# Patient Record
Sex: FEMALE | Race: WHITE | NOT HISPANIC OR LATINO | Employment: OTHER | URBAN - METROPOLITAN AREA
[De-identification: names, ages, dates, MRNs, and addresses within clinical notes are randomized per-mention and may not be internally consistent; named-entity substitution may affect disease eponyms.]

---

## 2018-01-18 NOTE — PATIENT DISCUSSION
(L73.301) Keratoconjunct sicca, not specified as Sjogren's, bilateral - Assesment : Examination revealed Dry Eye Syndrome - Plan : Monitor for changes. Advised patient to call our office with decreased vision or increased symptoms.  CPM: RESTASIS OU BID REFRESH ATS QD OU

## 2018-01-18 NOTE — PATIENT DISCUSSION
(C99.4116) Primary open-angle glaucoma bilateral mild stage - Assesment : Examination revealed Primary Open Angle Glaucoma. -MILD  PATIENT IS FOLLOWED UP [facility] IN MAY FOR GLAUCOMA. IOP OU CONTROLLED NICELY ON CURRENT GTT TX OU.  IF IOP CONTINUES TO TREND UPWARDS, MAY CONSIDER SLT OU - Plan : Monitor for changes. Advised patient to call our office when decreased vision or increased eye pain. TIMOLOL 0.5 % OD QAM  LATANOPROST OU QHS.   3-4 MONTHS 24-2 OU, TENSION CHECK  1 YEAR EXAM - PT SIGNING RELEASE FOR RECORDS UP [facility]

## 2018-04-19 NOTE — PATIENT DISCUSSION
(X31.2595) Primary open-angle glaucoma bilateral moderate stage - Assesment : Examination revealed Primary Open Angle Glaucoma. -MODERATE OD>OS. INCREASED C/D  IOP CONTROLLED NICELY ON CURRENT GTT TX OU. - Plan : Monitor for changes. Advised patient to call our office when decreased vision or increased eye pain. TIMOLOL 0.5 % OD QAM  LATANOPROST OU QHS. FOLLOW OPHTHALMOLOGIST EVERY 6 MONTHS.  1 YEAR EXAM / ON OCT Visual field performed.

## 2018-08-08 ENCOUNTER — ESTABLISHED COMPREHENSIVE EXAM (OUTPATIENT)
Dept: URBAN - METROPOLITAN AREA CLINIC 46 | Facility: CLINIC | Age: 78
End: 2018-08-08

## 2018-08-08 DIAGNOSIS — H52.7: ICD-10-CM

## 2018-08-08 PROCEDURE — 92014 COMPRE OPH EXAM EST PT 1/>: CPT

## 2018-08-08 PROCEDURE — 92015 DETERMINE REFRACTIVE STATE: CPT

## 2018-08-08 ASSESSMENT — TONOMETRY
OD_IOP_MMHG: 14
OS_IOP_MMHG: 14

## 2018-08-08 ASSESSMENT — VISUAL ACUITY
OD_SC: 20/20
OS_SC: 20/20
OD_SC: J6-1
OS_SC: J1+

## 2019-04-18 NOTE — PATIENT DISCUSSION
(T61.1311) Primary open-angle glaucoma right eye severe stage - Assesment : Examination revealed primary open angle glaucoma.   SEVERE - Plan : CONTINUE LATANOPROST

## 2019-04-18 NOTE — PATIENT DISCUSSION
(I92.3356) Primary open-angle glaucoma left eye moderate stage - Assesment : Examination revealed Primary Open Angle Glaucoma. -MODERATE OD>OS. INCREASED C/D  IOP CONTROLLED NICELY ON CURRENT GTT TX OU. - Plan : Monitor for changes. Advised patient to call our office when decreased vision or increased eye pain. TIMOLOL 0.5 % OD QAM  LATANOPROST OU QHS. FOLLOW OPHTHALMOLOGIST EVERY 6 MONTHS.

## 2019-08-22 ENCOUNTER — ESTABLISHED COMPREHENSIVE EXAM (OUTPATIENT)
Dept: URBAN - METROPOLITAN AREA CLINIC 46 | Facility: CLINIC | Age: 79
End: 2019-08-22

## 2019-08-22 DIAGNOSIS — H40.013: ICD-10-CM

## 2019-08-22 PROCEDURE — 92014 COMPRE OPH EXAM EST PT 1/>: CPT

## 2019-08-22 ASSESSMENT — VISUAL ACUITY
OD_SC: J5
OS_SC: 20/20-2
OS_SC: J1
OD_SC: 20/20-2

## 2019-08-22 ASSESSMENT — TONOMETRY
OD_IOP_MMHG: 12
OS_IOP_MMHG: 12

## 2020-02-11 ENCOUNTER — EST. PATIENT EMERGENCY (OUTPATIENT)
Dept: URBAN - METROPOLITAN AREA CLINIC 36 | Facility: CLINIC | Age: 80
End: 2020-02-11

## 2020-02-11 DIAGNOSIS — H53.8: ICD-10-CM

## 2020-02-11 DIAGNOSIS — H35.371: ICD-10-CM

## 2020-02-11 DIAGNOSIS — H43.393: ICD-10-CM

## 2020-02-11 PROCEDURE — 92134 CPTRZ OPH DX IMG PST SGM RTA: CPT

## 2020-02-11 PROCEDURE — 92014 COMPRE OPH EXAM EST PT 1/>: CPT

## 2020-02-11 ASSESSMENT — TONOMETRY
OS_IOP_MMHG: 14
OD_IOP_MMHG: 14

## 2020-02-11 ASSESSMENT — VISUAL ACUITY
OD_SC: 20/50
OS_SC: 20/25-2

## 2020-02-17 ENCOUNTER — DILATED FUNDUS EXAM (OUTPATIENT)
Dept: URBAN - METROPOLITAN AREA CLINIC 46 | Facility: CLINIC | Age: 80
End: 2020-02-17

## 2020-02-17 DIAGNOSIS — H53.8: ICD-10-CM

## 2020-02-17 DIAGNOSIS — H35.371: ICD-10-CM

## 2020-02-17 PROCEDURE — 92012 INTRM OPH EXAM EST PATIENT: CPT

## 2020-02-17 PROCEDURE — 92134 CPTRZ OPH DX IMG PST SGM RTA: CPT

## 2020-02-17 ASSESSMENT — VISUAL ACUITY
OD_PH: 20/40
OD_SC: J5
OD_RAM: 20/40
OS_SC: J1
OS_SC: 20/30
OD_SC: 20/80

## 2020-02-17 ASSESSMENT — TONOMETRY
OD_IOP_MMHG: 14
OS_IOP_MMHG: 13

## 2020-02-18 ENCOUNTER — RETINA CONSULT (OUTPATIENT)
Dept: URBAN - METROPOLITAN AREA CLINIC 46 | Facility: CLINIC | Age: 80
End: 2020-02-18

## 2020-02-18 DIAGNOSIS — H35.371: ICD-10-CM

## 2020-02-18 DIAGNOSIS — H43.812: ICD-10-CM

## 2020-02-18 DIAGNOSIS — H43.392: ICD-10-CM

## 2020-02-18 DIAGNOSIS — H53.8: ICD-10-CM

## 2020-02-18 DIAGNOSIS — H35.372: ICD-10-CM

## 2020-02-18 PROCEDURE — 92014 COMPRE OPH EXAM EST PT 1/>: CPT

## 2020-02-18 PROCEDURE — 92250 FUNDUS PHOTOGRAPHY W/I&R: CPT

## 2020-02-18 PROCEDURE — 92134 CPTRZ OPH DX IMG PST SGM RTA: CPT

## 2020-02-18 ASSESSMENT — TONOMETRY
OS_IOP_MMHG: 15
OD_IOP_MMHG: 14

## 2020-02-18 ASSESSMENT — VISUAL ACUITY
OS_SC: 20/40+2
OD_PH: 20/40
OD_SC: 20/70

## 2020-08-24 ENCOUNTER — ESTABLISHED COMPREHENSIVE EXAM (OUTPATIENT)
Dept: URBAN - METROPOLITAN AREA CLINIC 46 | Facility: CLINIC | Age: 80
End: 2020-08-24

## 2020-08-24 DIAGNOSIS — H52.7: ICD-10-CM

## 2020-08-24 DIAGNOSIS — H04.123: ICD-10-CM

## 2020-08-24 DIAGNOSIS — H40.013: ICD-10-CM

## 2020-08-24 DIAGNOSIS — H01.002: ICD-10-CM

## 2020-08-24 DIAGNOSIS — H35.371: ICD-10-CM

## 2020-08-24 PROCEDURE — 92014 COMPRE OPH EXAM EST PT 1/>: CPT

## 2020-08-24 PROCEDURE — 92015 DETERMINE REFRACTIVE STATE: CPT

## 2020-08-24 ASSESSMENT — VISUAL ACUITY
OS_SC: J3
OS_SC: 20/25-2
OD_SC: 20/20-2
OD_SC: J6

## 2020-08-24 ASSESSMENT — TONOMETRY
OD_IOP_MMHG: 13
OS_IOP_MMHG: 12

## 2021-08-30 ENCOUNTER — ESTABLISHED COMPREHENSIVE EXAM (OUTPATIENT)
Dept: URBAN - METROPOLITAN AREA CLINIC 46 | Facility: CLINIC | Age: 81
End: 2021-08-30

## 2021-08-30 DIAGNOSIS — H35.371: ICD-10-CM

## 2021-08-30 DIAGNOSIS — H40.013: ICD-10-CM

## 2021-08-30 DIAGNOSIS — H01.002: ICD-10-CM

## 2021-08-30 DIAGNOSIS — H52.7: ICD-10-CM

## 2021-08-30 DIAGNOSIS — H04.123: ICD-10-CM

## 2021-08-30 DIAGNOSIS — H53.8: ICD-10-CM

## 2021-08-30 DIAGNOSIS — H35.372: ICD-10-CM

## 2021-08-30 PROCEDURE — 92083 EXTENDED VISUAL FIELD XM: CPT

## 2021-08-30 PROCEDURE — 92014 COMPRE OPH EXAM EST PT 1/>: CPT

## 2021-08-30 PROCEDURE — 92133 CPTRZD OPH DX IMG PST SGM ON: CPT

## 2021-08-30 PROCEDURE — 92015 DETERMINE REFRACTIVE STATE: CPT

## 2021-08-30 ASSESSMENT — VISUAL ACUITY
OD_SC: J5
OU_SC: J1
OS_SC: J1
OS_SC: 20/40
OD_SC: 20/25

## 2021-08-30 ASSESSMENT — TONOMETRY
OD_IOP_MMHG: 14
OS_IOP_MMHG: 16

## 2022-08-29 ENCOUNTER — COMPREHENSIVE EXAM (OUTPATIENT)
Dept: URBAN - METROPOLITAN AREA CLINIC 46 | Facility: CLINIC | Age: 82
End: 2022-08-29

## 2022-08-29 DIAGNOSIS — H40.013: ICD-10-CM

## 2022-08-29 DIAGNOSIS — H35.373: ICD-10-CM

## 2022-08-29 DIAGNOSIS — H01.002: ICD-10-CM

## 2022-08-29 DIAGNOSIS — H01.005: ICD-10-CM

## 2022-08-29 DIAGNOSIS — H04.123: ICD-10-CM

## 2022-08-29 PROCEDURE — 92014 COMPRE OPH EXAM EST PT 1/>: CPT

## 2022-08-29 ASSESSMENT — VISUAL ACUITY
OD_SC: J5
OS_SC: 20/30+2
OD_SC: 20/20
OS_SC: J1

## 2022-08-29 ASSESSMENT — TONOMETRY
OS_IOP_MMHG: 14
OD_IOP_MMHG: 16

## 2023-09-06 ENCOUNTER — COMPREHENSIVE EXAM (OUTPATIENT)
Dept: URBAN - METROPOLITAN AREA CLINIC 46 | Facility: CLINIC | Age: 83
End: 2023-09-06

## 2023-09-06 DIAGNOSIS — H35.373: ICD-10-CM

## 2023-09-06 DIAGNOSIS — H52.7: ICD-10-CM

## 2023-09-06 DIAGNOSIS — H40.013: ICD-10-CM

## 2023-09-06 DIAGNOSIS — H04.123: ICD-10-CM

## 2023-09-06 DIAGNOSIS — H01.002: ICD-10-CM

## 2023-09-06 DIAGNOSIS — H01.005: ICD-10-CM

## 2023-09-06 PROCEDURE — 92133 CPTRZD OPH DX IMG PST SGM ON: CPT

## 2023-09-06 PROCEDURE — 92015 DETERMINE REFRACTIVE STATE: CPT

## 2023-09-06 PROCEDURE — 92014 COMPRE OPH EXAM EST PT 1/>: CPT

## 2023-09-06 PROCEDURE — 76514 ECHO EXAM OF EYE THICKNESS: CPT

## 2023-09-06 ASSESSMENT — VISUAL ACUITY
OU_SC: J1
OS_SC: 20/50
OS_PH: 20/25
OS_SC: J1
OD_SC: J10
OD_SC: 20/25
OU_SC: 20/25

## 2023-09-06 ASSESSMENT — PACHYMETRY
OD_CT_UM: 563
OS_CT_UM: 582

## 2023-09-06 ASSESSMENT — TONOMETRY
OS_IOP_MMHG: 13
OD_IOP_MMHG: 15

## 2024-10-28 ENCOUNTER — COMPREHENSIVE EXAM (OUTPATIENT)
Dept: URBAN - METROPOLITAN AREA CLINIC 46 | Facility: CLINIC | Age: 84
End: 2024-10-28

## 2024-10-28 DIAGNOSIS — H40.013: ICD-10-CM

## 2024-10-28 DIAGNOSIS — H35.373: ICD-10-CM

## 2024-10-28 DIAGNOSIS — H04.123: ICD-10-CM

## 2024-10-28 DIAGNOSIS — H52.7: ICD-10-CM

## 2024-10-28 PROCEDURE — 92015 DETERMINE REFRACTIVE STATE: CPT

## 2024-10-28 PROCEDURE — 92134 CPTRZ OPH DX IMG PST SGM RTA: CPT

## 2024-10-28 PROCEDURE — 92014 COMPRE OPH EXAM EST PT 1/>: CPT
